# Patient Record
Sex: FEMALE | Race: WHITE | Employment: FULL TIME | URBAN - METROPOLITAN AREA
[De-identification: names, ages, dates, MRNs, and addresses within clinical notes are randomized per-mention and may not be internally consistent; named-entity substitution may affect disease eponyms.]

---

## 2020-07-05 ENCOUNTER — HOSPITAL ENCOUNTER (EMERGENCY)
Age: 25
Discharge: HOME | End: 2020-07-05
Attending: EMERGENCY MEDICINE
Payer: COMMERCIAL

## 2020-07-05 VITALS
TEMPERATURE: 99.1 F | SYSTOLIC BLOOD PRESSURE: 131 MMHG | DIASTOLIC BLOOD PRESSURE: 93 MMHG | HEART RATE: 74 BPM | OXYGEN SATURATION: 96 % | RESPIRATION RATE: 30 BRPM

## 2020-07-05 PROCEDURE — 99282 EMERGENCY DEPT VISIT SF MDM: CPT

## 2020-07-05 ASSESSMENT — ENCOUNTER SYMPTOMS
NAUSEA: 0
VOMITING: 0
ABDOMINAL PAIN: 0
SHORTNESS OF BREATH: 0
SORE THROAT: 0
CHEST TIGHTNESS: 0

## 2020-07-06 NOTE — ED PROVIDER NOTES
[address] [facility] [address] ENCOUNTER          EM RESIDENT NOTE       Date of evaluation: 7/5/2020    Chief Complaint     Anxiety      History of Present Illness     Cory Brady is a 30 y.o. female who presents to the emergency department for evaluation of anxiety after getting an argument with her boyfriend. Patient states that she does not know all the triggers but starting last night she has just been in an emotional state. She states that when she gets into panic attack, she has some tingling in her fingers and she sometimes gets lightheaded. She denies any syncope. Patient states that she has had a history of depression and has been on Zoloft in the past.  She denies using Zoloft for the past 3 months. Her last menstrual cycle was last week. Review of Systems     Review of Systems   Constitutional: Negative for chills and fever. HENT: Negative for congestion and sore throat. Respiratory: Negative for chest tightness and shortness of breath. Cardiovascular: Negative for chest pain and leg swelling. Gastrointestinal: Negative for abdominal pain, nausea and vomiting. Genitourinary: Negative for difficulty urinating, flank pain and pelvic pain. Musculoskeletal: Negative for gait problem and neck pain. Neurological: Negative for dizziness, syncope, weakness and light-headedness. Hematological: Negative for adenopathy. Psychiatric/Behavioral: Negative for confusion. The patient is nervous/anxious. Past Medical, Surgical, Family, and Social History     She has a past medical history of Anxiety, Calculus (=stone), and Depression. She has a past surgical history that includes Tonsillectomy; Adenoidectomy; and Cholecystectomy (6/9/16). Her family history is not on file. She reports that she has never smoked. She has never used smokeless tobacco. She reports that she does not drink alcohol or use drugs.     Medications     Previous Medications    ONDANSETRON (ZOFRAN) 4 MG TABLET    Take 4 mg by mouth every 8 hours as needed for Nausea or Vomiting    PROMETHAZINE (PROMETHEGAN) 25 MG SUPPOSITORY    Place 1 suppository rectally every 6 hours as needed for Nausea       Allergies     She is allergic to latex and nickel. Physical Exam     INITIAL VITALS: BP: (!) 189/120, Temp: 99.1 °F (37.3 °C), Pulse: 127, Resp: 30, SpO2: 93 %   Physical Exam  Constitutional:       Appearance: She is well-developed. HENT:      Head: Normocephalic and atraumatic. Right Ear: Tympanic membrane normal.      Left Ear: Tympanic membrane normal.      Mouth/Throat:      Mouth: Mucous membranes are dry. Eyes:      Pupils: Pupils are equal, round, and reactive to light. Neck:      Musculoskeletal: Neck supple. Cardiovascular:      Rate and Rhythm: Regular rhythm. Tachycardia present. Pulmonary:      Effort: Pulmonary effort is normal. No respiratory distress. Breath sounds: Normal breath sounds. No wheezing, rhonchi or rales. Comments: Tachypneic  Chest:      Chest wall: No tenderness. Abdominal:      General: There is no distension. Palpations: Abdomen is soft. Tenderness: There is no abdominal tenderness. Musculoskeletal: Normal range of motion. General: No tenderness or deformity. Skin:     General: Skin is warm and dry. Capillary Refill: Capillary refill takes 2 to 3 seconds. Findings: No erythema. Neurological:      General: No focal deficit present. Mental Status: She is alert and oriented to person, place, and time. Cranial Nerves: No cranial nerve deficit. Coordination: Coordination normal.   Psychiatric:      Comments: Tearful         DiagnosticResults         RADIOLOGY:  No orders to display       LABS:   No results found for this visit on 07/05/20.       RECENT VITALS:  BP: 126/86, Temp: 99.1 °F (37.3 °C), Pulse: 127,Resp: 30, SpO2: 100 %     Procedures       ED Course     Nursing Notes, Past Medical Hx, Past Surgical Hx, Social Hx, Allergies, and Family Hx were reviewed. The patient was given the followingmedications:  No orders of the defined types were placed in this encounter. CONSULTS:  None    MEDICAL DECISION MAKING / ASSESSMENT / Rohan Cass Alejandre is a 30 y.o. female with a history of depression anxiety coming in after sustaining a panic attack from an argument with her boyfriend. The patient is not suicidal and does not have any homicidal ideation at this time. She was treated with combing techniques such as turning the lights off and getting a warm blanket. Her heart rate significantly dropped down to 74. Her feelings of anxiety significant improved and she was not having any lightheadedness or tingling in her fingers. The patient did not get any medications in her vital signs improved significantly. She feels safe at home and has other family members that she can call currently for transportation for help. She states that she has a psychiatrist that she has seen in the past but will be giving her some resources here. This patient was also evaluated by the attending physician. All care plans werediscussed and agreed upon. Clinical Impression     1. Anxiety state        Disposition     PATIENT REFERRED TO:  No follow-up provider specified.     DISCHARGE MEDICATIONS:  New Prescriptions    No medications on file       DISPOSITION Decision To Discharge 07/05/2020 09:28:21 PM        Felice Lanes, MD  Resident  07/05/20 [id number]

## 2020-07-06 NOTE — ED PROVIDER NOTES
ED Attending Attestation Note     Date of evaluation: 7/5/2020    This patient was seen by the resident. I have seen and examined the patient, agree with the workup, evaluation, management and diagnosis. The care plan has been discussed. I was present for any procedures performed in the resident's  note and have made edits to the note where appropriate. My assessment reveals 30 y.o. female with history of anxiety/depression presenting for anxiety and panic attack. Has multiple stressors in her current relationship that triggered her symptoms today. Vital signs improved spontaneously without intervention in the emergency department. Heart regular rate and rhythm, is tearful but no suicidal or homicidal ideation.        Von Vasquez MD  07/05/20 [id number]